# Patient Record
Sex: FEMALE | Race: WHITE | Employment: UNEMPLOYED | URBAN - NONMETROPOLITAN AREA
[De-identification: names, ages, dates, MRNs, and addresses within clinical notes are randomized per-mention and may not be internally consistent; named-entity substitution may affect disease eponyms.]

---

## 2021-06-18 ENCOUNTER — APPOINTMENT (OUTPATIENT)
Dept: GENERAL RADIOLOGY | Age: 61
End: 2021-06-18
Payer: OTHER GOVERNMENT

## 2021-06-18 ENCOUNTER — HOSPITAL ENCOUNTER (EMERGENCY)
Age: 61
Discharge: HOME | End: 2021-06-18
Attending: EMERGENCY MEDICINE
Payer: OTHER GOVERNMENT

## 2021-06-18 VITALS
RESPIRATION RATE: 17 BRPM | BODY MASS INDEX: 31.02 KG/M2 | HEART RATE: 78 BPM | HEIGHT: 66 IN | WEIGHT: 193 LBS | DIASTOLIC BLOOD PRESSURE: 96 MMHG | SYSTOLIC BLOOD PRESSURE: 139 MMHG | OXYGEN SATURATION: 99 % | TEMPERATURE: 98 F
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
## 2021-06-18 PROCEDURE — 96372 THER/PROPH/DIAG INJ SC/IM: CPT

## 2021-06-18 PROCEDURE — 2500000003 HC RX 250 WO HCPCS: Performed by: STUDENT IN AN ORGANIZED HEALTH CARE EDUCATION/TRAINING PROGRAM

## 2021-06-18 PROCEDURE — 6370000000 HC RX 637 (ALT 250 FOR IP): Performed by: STUDENT IN AN ORGANIZED HEALTH CARE EDUCATION/TRAINING PROGRAM

## 2021-06-18 PROCEDURE — 99283 EMERGENCY DEPT VISIT LOW MDM: CPT

## 2021-06-18 PROCEDURE — 73130 X-RAY EXAM OF HAND: CPT

## 2021-06-18 PROCEDURE — 99214 OFFICE O/P EST MOD 30 MIN: CPT

## 2021-06-18 PROCEDURE — 99204 OFFICE O/P NEW MOD 45 MIN: CPT

## 2021-06-18 PROCEDURE — 6360000002 HC RX W HCPCS: Performed by: STUDENT IN AN ORGANIZED HEALTH CARE EDUCATION/TRAINING PROGRAM

## 2021-06-18 PROCEDURE — 90471 IMMUNIZATION ADMIN: CPT | Performed by: STUDENT IN AN ORGANIZED HEALTH CARE EDUCATION/TRAINING PROGRAM

## 2021-06-18 PROCEDURE — 90715 TDAP VACCINE 7 YRS/> IM: CPT | Performed by: STUDENT IN AN ORGANIZED HEALTH CARE EDUCATION/TRAINING PROGRAM

## 2021-06-18 RX ORDER — ACETAMINOPHEN 500 MG
1000 TABLET ORAL ONCE
Status: COMPLETED | OUTPATIENT
Start: 2021-06-18 | End: 2021-06-18

## 2021-06-18 RX ORDER — CLINDAMYCIN HYDROCHLORIDE 150 MG/1
450 CAPSULE ORAL 3 TIMES DAILY
Qty: 90 CAPSULE | Refills: 0 | Status: SHIPPED | OUTPATIENT
Start: 2021-06-18 | End: 2021-06-28

## 2021-06-18 RX ORDER — LIDOCAINE HYDROCHLORIDE AND EPINEPHRINE 10; 10 MG/ML; UG/ML
20 INJECTION, SOLUTION INFILTRATION; PERINEURAL ONCE
Status: COMPLETED | OUTPATIENT
Start: 2021-06-18 | End: 2021-06-18

## 2021-06-18 RX ORDER — DOXYCYCLINE HYCLATE 100 MG
100 TABLET ORAL 2 TIMES DAILY
Qty: 20 TABLET | Refills: 0 | Status: SHIPPED | OUTPATIENT
Start: 2021-06-18 | End: 2021-06-28

## 2021-06-18 RX ADMIN — LIDOCAINE HYDROCHLORIDE AND EPINEPHRINE 20 ML: 10; 10 INJECTION, SOLUTION INFILTRATION; PERINEURAL at 13:33

## 2021-06-18 RX ADMIN — ACETAMINOPHEN 1000 MG: 500 TABLET ORAL at 13:16

## 2021-06-18 RX ADMIN — TETANUS TOXOID, REDUCED DIPHTHERIA TOXOID AND ACELLULAR PERTUSSIS VACCINE, ADSORBED 0.5 ML: 5; 2.5; 8; 8; 2.5 SUSPENSION INTRAMUSCULAR at 13:16

## 2021-06-18 ASSESSMENT — ENCOUNTER SYMPTOMS
COUGH: 0
SINUS PAIN: 0
ABDOMINAL PAIN: 0
SHORTNESS OF BREATH: 0
BACK PAIN: 0
EYE REDNESS: 0
NAUSEA: 0
SORE THROAT: 0
RHINORRHEA: 0
VOMITING: 0
DIARRHEA: 0

## 2021-06-18 ASSESSMENT — PAIN SCALES - GENERAL
PAINLEVEL_OUTOF10: 0
PAINLEVEL_OUTOF10: 9

## 2021-06-18 NOTE — ED TRIAGE NOTES
Pt presents to  with c/o right hand injury following being bit by her own dog. Pt reports dog was in a dog show when he was lunged at by another dog and she came in between. Pt has large gaping laceration to right hand near thumb. Pt reports shots are all up to date of dog.

## 2021-06-18 NOTE — ED PROVIDER NOTES
[address] [address]          Pt Name: Gretta Bradshaw  MRN: [id number]  [place] 1960  Date of evaluation: 6/18/2021  Treating Resident Physician: Lobito Luther MD  Supervising Physician: Dr. William Dee       Chief Complaint   Patient presents with   A[address] Injury     right     History obtained from the patient. HISTORY OF PRESENT ILLNESS    HPI  Gretta Bradshaw is a 59 y.o. female with pmhx of arthritis, cholecystectomy, hysterectomy who presents to the emergency department for evaluation of bite to the right hand. Patient was initially seen in urgent care and was sent over here for further evaluation. Patient states that was her own dog this morning and she was standing underneath a covering when there was lightening and rain occurring under the dog lunged for her dog she and her own dog who then bit her right hand. She states her dog is up-to-date on his vaccinations. She cannot recall her last tetanus shot. She is able to move her entire hand denies any other injuries or complaints at this time. The patient has no other acute complaints at this time. REVIEW OF SYSTEMS   Review of Systems   Constitutional: Negative for chills and fever. HENT: Negative for rhinorrhea, sinus pain and sore throat. Eyes: Negative for redness. Respiratory: Negative for cough and shortness of breath. Cardiovascular: Negative for chest pain. Gastrointestinal: Negative for abdominal pain, diarrhea, nausea and vomiting. Genitourinary: Negative for dysuria. Musculoskeletal: Negative for back pain. Skin: Positive for wound. Negative for rash. Right hand wound   Neurological: Negative for light-headedness and headaches. Psychiatric/Behavioral: Negative for agitation.          PAST MEDICAL AND SURGICAL HISTORY     Past Medical History:   Diagnosis Date    Arthritis      Past Surgical History: Procedure Laterality Date    CHOLECYSTECTOMY      HYSTERECTOMY      SKIN BIOPSY           MEDICATIONS     Current Facility-Administered Medications:     acetaminophen (TYLENOL) tablet 1,000 mg, 1,000 mg, Oral, Once, Bhanu Hernandez MD    Tetanus-Diphth-Acell Pertussis (BOOSTRIX) injection 0.5 mL, 0.5 mL, Intramuscular, Once, Bhanu Hernandez MD    lidocaine-EPINEPHrine 1 %-1:734054 injection 20 mL, 20 mL, Intradermal, Once, Bhanu Hernandez MD    Current Outpatient Medications:     clindamycin (CLEOCIN) 150 MG capsule, Take 3 capsules by mouth 3 times daily for 10 days, Disp: 90 capsule, Rfl: 0    doxycycline hyclate (VIBRA-TABS) 100 MG tablet, Take 1 tablet by mouth 2 times daily for 10 days, Disp: 20 tablet, Rfl: 0      SOCIAL HISTORY     Social History     Social History Narrative    Not on file     Social History     Tobacco Use    Smoking status: Never Smoker    Smokeless tobacco: Never Used   Substance Use Topics    Alcohol use: Not Currently    Drug use: Never         ALLERGIES     Allergies   Allergen Reactions    Morphine Shortness Of Breath    Codeine Hives    Glipizide Other (See Comments)     Syncope    Statins Other (See Comments)     Rhabdo    Sulfa Antibiotics Other (See Comments)     Rhabdo    Penicillins Rash         FAMILY HISTORY   History reviewed. No pertinent family history. PREVIOUS RECORDS   Previous records reviewed: Patient had office visit on 3/8/2019 for acute sinusitis. PHYSICAL EXAM     ED Triage Vitals [06/18/21 1051]   BP Temp Temp src Pulse Resp SpO2 Height Weight   (!) 143/69 98 °F (36.7 °C) -- 79 18 98 % 5' 6\" (1.676 m) 193 lb (87.5 kg)     Initial vital signs and nursing assessment reviewed and normal. Pulsoximetry is normal per my interpretation. Additional Vital Signs:  Vitals:    06/18/21 1120   BP: (!) 139/96   Pulse: 78   Resp: 17   Temp:    SpO2: 99%       Physical Exam  Vitals and nursing note reviewed.    Constitutional: General: She is not in acute distress. Appearance: Normal appearance. She is not toxic-appearing. HENT:      Head: Normocephalic and atraumatic. Right Ear: External ear normal.      Left Ear: External ear normal.      Nose: Nose normal.      Mouth/Throat:      Mouth: Mucous membranes are moist.      Pharynx: Oropharynx is clear. Eyes:      General: No scleral icterus. Conjunctiva/sclera: Conjunctivae normal.   Cardiovascular:      Rate and Rhythm: Normal rate and regular rhythm. Pulses: Normal pulses. Pulmonary:      Effort: Pulmonary effort is normal. No respiratory distress. Musculoskeletal:         General: Normal range of motion. Cervical back: Normal range of motion. No rigidity. No muscular tenderness. Comments: Patient is full range of motion of her right wrist with normal flexion normal extension. She can oppose each digit. She has normal digit flexion and extension normal abduction and abduction. Sensation is normal without the entire right hand. She has good capillary refill. She is neurovascular intact. She has an avulsion that is triangular shaped on the dorsum side of her right hand at the base of the thumb and the first digit. She also has a linear laceration to the thenar eminence approximately 2-1/2 cm in length. No active bleeding. Skin:     General: Skin is warm. Capillary Refill: Capillary refill takes less than 2 seconds. Coloration: Skin is not jaundiced. Neurological:      General: No focal deficit present. Mental Status: She is alert and oriented to person, place, and time. Psychiatric:         Mood and Affect: Mood normal.         Behavior: Behavior normal.         MEDICAL DECISION MAKING   Initial Assessment: This is a 64-year-old female who was underneath a covering with her dog and multiple people when another dog lunged for dog she pulled her dog's leash who then bit her right hand. Her dog's shots are up-to-date.   She is not up-to-date on tetanus. She has an avulsion to the dorsum of her head as well as a linear laceration to the thenar eminence. MDM:   Patient will have an x-ray of the right hand. She will be given a tetanus booster. She will be given Tylenol for pain control. She will undergo numbing with lidocaine of the injuries as well as loose approximation with sutures when she returns from x-ray and after she has had her wound cleaned extensively. ED RESULTS   Laboratory results:  Labs Reviewed - No data to display    Radiologic studies results:  XR HAND RIGHT (MIN 3 VIEWS)   Final Result   1. Soft tissue swelling   2. Questionable subcutaneous gas adjacent to the distal radius            **This report has been created using voice recognition software. It may contain minor errors which are inherent in voice recognition technology. **      Final report electronically signed by Dr. Eber Alanis on 6/18/2021 11:59 AM          ED Medications administered this visit:   Medications   acetaminophen (TYLENOL) tablet 1,000 mg (has no administration in time range)   Tetanus-Diphth-Acell Pertussis (BOOSTRIX) injection 0.5 mL (has no administration in time range)   lidocaine-EPINEPHrine 1 %-1:122781 injection 20 mL (has no administration in time range)         ED COURSE     ED Course as of Jun 18 1307   Fri Jun 18, 2021   1206 \"IMPRESSION:  1. Soft tissue swelling  2. Questionable subcutaneous gas adjacent to the distal radius \"   XR HAND RIGHT (MIN 3 VIEWS) [AL]      ED Course User Index  [AL] Yariel Coyle MD     Strict return precautions and follow up instructions were discussed with the patient prior to discharge, with which the patient agrees.     Lac Repair    Date/Time: 7/29/2021 8:35 PM  Performed by: Reyne Schaumann, MD  Authorized by: Reyne Schaumann, MD     Consent:     Consent obtained:  Written and verbal    Consent given by:  Patient    Risks discussed:  Infection, pain, poor cosmetic result and MD, 06/18/21, 1:07 PM         Devendra Alvarez MD  Resident  06/18/21 Christin [address] Rosey Hicks, MD  07/29/21 7198

## 2021-06-18 NOTE — ED PROVIDER NOTES
[address] [facility] [address] [address] EMERGENCY DEPT  UrgentCare Encounter      [address] [facility]       Chief Complaint   Patient presents with    Animal Bite       Nurses Notes reviewed and I agree except as noted in the HPI. HISTORY OF PRESENT ILLNESS   Savannah Florez is a 59 y.o. female who presents for evaluation of probably the worst dog bite that she ever had on her right hand. She states that she was showing her dog at the [facility] just prior to arrival when a dog lunged at her dog and she went to  pull her dog back her dog bite her on her right hand. Her dog is current with immunizations. She states that her Tetnus is current. A dog lunged at her dog     REVIEW OF SYSTEMS     Review of Systems   Constitutional: Negative for chills and fever. Skin: Positive for wound (right hand). Negative for rash. Allergic/Immunologic: Negative for environmental allergies and food allergies. Neurological: Negative for dizziness and headaches. Hematological: Negative for adenopathy. PAST MEDICAL HISTORY         Diagnosis Date    Arthritis        SURGICAL HISTORY     Patient  has a past surgical history that includes Cholecystectomy; skin biopsy; and Hysterectomy. CURRENT MEDICATIONS       Previous Medications    No medications on file       ALLERGIES     Patient is is allergic to morphine, codeine, glipizide, statins, sulfa antibiotics, and penicillins. FAMILY HISTORY     Patient'sfamily history is not on file. SOCIAL HISTORY     Patient  reports that she has never smoked. She has never used smokeless tobacco. She reports previous alcohol use. She reports that she does not use drugs. PHYSICAL EXAM     ED TRIAGE VITALS  BP: (!) 143/69, Temp: 98 °F (36.7 °C), Pulse: 79, Resp: 18, SpO2: 98 %  Physical Exam  Vitals and nursing note reviewed. Constitutional:       General: She is not in acute distress. Appearance: Normal appearance. She is well-developed.    HENT:      Head: Normocephalic and and further management .            Problem List Items Addressed This Visit     None      Visit Diagnoses     Dog bite, initial encounter    -  Primary          PATIENT REFERRED TO:  [facility], EMERGENCY DEPT  [address]      [address] TO [address] [place] [address], for further evalation      Connie Lopez, [address] A XOCHITL Marie - CNP  06/18/21 9964

## 2021-06-18 NOTE — Clinical Note
Patient requires further evaluation and management (repair)  that cannot be completed here in the urgent care for dog bite of her hand. Tetanus current. Dog's immunizations current.   Called to [address] [facility] [facility] ED.

## 2021-06-18 NOTE — ED NOTES
Pt to ED from [facility] with c/o right hand injury from a dog bite. Pt is able to move all finger and denies numbness. Pt MARYANNE.       Andrew Nicole, LOUIE  06/18/21 1348

## 2025-07-30 ENCOUNTER — HOSPITAL ENCOUNTER (EMERGENCY)
Age: 65
Discharge: HOME | End: 2025-07-30
Payer: MEDICARE

## 2025-07-30 VITALS
RESPIRATION RATE: 16 BRPM | DIASTOLIC BLOOD PRESSURE: 76 MMHG | HEART RATE: 91 BPM | TEMPERATURE: 98.3 F | SYSTOLIC BLOOD PRESSURE: 122 MMHG | OXYGEN SATURATION: 97 %

## 2025-07-30 DIAGNOSIS — S81.812A LACERATION OF LEFT LOWER EXTREMITY, INITIAL ENCOUNTER: Primary | ICD-10-CM

## 2025-07-30 PROCEDURE — 12032 INTMD RPR S/A/T/EXT 2.6-7.5: CPT

## 2025-07-30 PROCEDURE — 6360000002 HC RX W HCPCS

## 2025-07-30 PROCEDURE — 99283 EMERGENCY DEPT VISIT LOW MDM: CPT

## 2025-07-30 PROCEDURE — 12002 RPR S/N/AX/GEN/TRNK2.6-7.5CM: CPT

## 2025-07-30 RX ORDER — BACITRACIN ZINC 500 [USP'U]/G
OINTMENT TOPICAL ONCE
Status: DISCONTINUED | OUTPATIENT
Start: 2025-07-30 | End: 2025-07-30 | Stop reason: HOSPADM

## 2025-07-30 RX ORDER — ASPIRIN 81 MG/1
81 TABLET, CHEWABLE ORAL DAILY
COMMUNITY

## 2025-07-30 RX ORDER — LEVOCETIRIZINE DIHYDROCHLORIDE 5 MG/1
5 TABLET, FILM COATED ORAL NIGHTLY
COMMUNITY

## 2025-07-30 RX ORDER — LIDOCAINE HYDROCHLORIDE AND EPINEPHRINE 10; 10 MG/ML; UG/ML
INJECTION, SOLUTION INFILTRATION; PERINEURAL
Status: COMPLETED
Start: 2025-07-30 | End: 2025-07-30

## 2025-07-30 RX ORDER — METFORMIN HYDROCHLORIDE 750 MG/1
750 TABLET, EXTENDED RELEASE ORAL
COMMUNITY

## 2025-07-30 RX ORDER — LIDOCAINE HYDROCHLORIDE AND EPINEPHRINE 10; 10 MG/ML; UG/ML
20 INJECTION, SOLUTION INFILTRATION; PERINEURAL ONCE
Status: COMPLETED | OUTPATIENT
Start: 2025-07-30 | End: 2025-07-30

## 2025-07-30 RX ORDER — DOXYCYCLINE HYCLATE 100 MG
100 TABLET ORAL 2 TIMES DAILY
Qty: 14 TABLET | Refills: 0 | Status: SHIPPED | OUTPATIENT
Start: 2025-07-30 | End: 2025-08-06

## 2025-07-30 RX ADMIN — LIDOCAINE HYDROCHLORIDE,EPINEPHRINE BITARTRATE 20 ML: 10; .01 INJECTION, SOLUTION INFILTRATION; PERINEURAL at 19:07

## 2025-07-30 RX ADMIN — LIDOCAINE HYDROCHLORIDE AND EPINEPHRINE 20 ML: 10; 10 INJECTION, SOLUTION INFILTRATION; PERINEURAL at 19:07

## 2025-07-30 ASSESSMENT — LIFESTYLE VARIABLES
HOW OFTEN DO YOU HAVE A DRINK CONTAINING ALCOHOL: NEVER
HOW MANY STANDARD DRINKS CONTAINING ALCOHOL DO YOU HAVE ON A TYPICAL DAY: PATIENT DOES NOT DRINK

## 2025-07-30 NOTE — ED PROVIDER NOTES
[facility]  Department of Emergency Medicine   ED  Encounter Note  Admit Date/RoomTime: 2025  5:49 PM  ED Room:     NAME: Mary Dietz  : 1960  MRN: [id number]     Chief Complaint:  Laceration (Lac to posterior knee from car door, then fell forward hitting table to anterior thigh, hard bruise/contusion)    History of Present Illness       Mary Dietz is a 65 y.o. old female who presents to the emergency department by private vehicle, for traumatic Left leg pain which occured several hour(s) prior to arrival.   The complaint is due to scrape by a camper door to the posterior leg and then fell hitting her anterior leg on a table.  Her weight bearing status is normal.  Patient has no prior history of pain/injury with regards to today's visit.   Since onset the symptoms have been stable.  Her pain is aggraveated by any movement and relieved by nothing, as no treatment has been provided prior to this visit. She denies any fall to the ground, head injury, loss of consciousness.  Reports tetanus shot up-to-date.    ROS   Pertinent positives and negatives are stated within HPI, all other systems reviewed and are negative.    Past Medical History:  has a past medical history of Arthritis.    Surgical History:  has a past surgical history that includes Cholecystectomy; skin biopsy; and Hysterectomy.    Social History:  reports that she has never smoked. She has never used smokeless tobacco. She reports that she does not currently use alcohol. She reports that she does not use drugs.    Family History: family history is not on file.     Allergies: Morphine, Codeine, Glipizide, Statins, Sulfa antibiotics, and Penicillins    Physical Exam   Oxygen Saturation Interpretation: Normal.        ED Triage Vitals [25 1734]   BP Systolic BP Percentile Diastolic BP Percentile Temp Temp Source Pulse Respirations SpO2   122/76 -- -- 98.3 °F (36.8 °C) Oral 91 16 97 %      Height Weight